# Patient Record
Sex: MALE | Race: WHITE | Employment: OTHER | URBAN - METROPOLITAN AREA
[De-identification: names, ages, dates, MRNs, and addresses within clinical notes are randomized per-mention and may not be internally consistent; named-entity substitution may affect disease eponyms.]

---

## 2017-03-31 ENCOUNTER — ANESTHESIA EVENT (OUTPATIENT)
Dept: MEDSURG UNIT | Age: 57
End: 2017-03-31
Payer: MEDICAID

## 2017-03-31 RX ORDER — CLOPIDOGREL BISULFATE 75 MG/1
75 TABLET ORAL DAILY
COMMUNITY
End: 2017-03-31

## 2017-03-31 RX ORDER — ISOSORBIDE MONONITRATE 30 MG/1
30 TABLET, EXTENDED RELEASE ORAL DAILY
COMMUNITY

## 2017-03-31 RX ORDER — CARVEDILOL 3.12 MG/1
3.12 TABLET ORAL 2 TIMES DAILY WITH MEALS
COMMUNITY

## 2017-03-31 RX ORDER — RANOLAZINE 1000 MG/1
1000 TABLET, EXTENDED RELEASE ORAL 2 TIMES DAILY
COMMUNITY

## 2017-03-31 RX ORDER — TESTOSTERONE 20.25 MG/1.25G
20.25 GEL TOPICAL DAILY
COMMUNITY
End: 2022-08-31 | Stop reason: ALTCHOICE

## 2017-03-31 RX ORDER — GUAIFENESIN 100 MG/5ML
LIQUID (ML) ORAL DAILY
COMMUNITY

## 2017-03-31 RX ORDER — GABAPENTIN 600 MG/1
600 TABLET ORAL 3 TIMES DAILY
COMMUNITY

## 2017-03-31 RX ORDER — METFORMIN HYDROCHLORIDE 1000 MG/1
1000 TABLET ORAL 2 TIMES DAILY WITH MEALS
COMMUNITY

## 2017-04-03 ENCOUNTER — SURGERY (OUTPATIENT)
Age: 57
End: 2017-04-03

## 2017-04-03 ENCOUNTER — HOSPITAL ENCOUNTER (OUTPATIENT)
Age: 57
Setting detail: OUTPATIENT SURGERY
Discharge: HOME | End: 2017-04-03
Attending: ORTHOPAEDIC SURGERY | Admitting: ORTHOPAEDIC SURGERY
Payer: MEDICAID

## 2017-04-03 ENCOUNTER — ANESTHESIA (OUTPATIENT)
Dept: MEDSURG UNIT | Age: 57
End: 2017-04-03
Payer: MEDICAID

## 2017-04-03 VITALS
HEART RATE: 53 BPM | WEIGHT: 230 LBS | SYSTOLIC BLOOD PRESSURE: 147 MMHG | TEMPERATURE: 97.9 F | DIASTOLIC BLOOD PRESSURE: 71 MMHG | HEIGHT: 69 IN | BODY MASS INDEX: 34.07 KG/M2 | OXYGEN SATURATION: 94 % | RESPIRATION RATE: 12 BRPM

## 2017-04-03 PROBLEM — G56.02 CARPAL TUNNEL SYNDROME OF LEFT WRIST: Status: ACTIVE | Noted: 2017-04-03

## 2017-04-03 PROBLEM — I25.10 CAD (CORONARY ARTERY DISEASE): Chronic | Status: ACTIVE | Noted: 2017-04-03

## 2017-04-03 PROBLEM — E10.65 HYPERGLYCEMIA DUE TO TYPE 1 DIABETES MELLITUS (HCC): Chronic | Status: ACTIVE | Noted: 2017-04-03

## 2017-04-03 LAB
GLUCOSE BLD STRIP.AUTO-MCNC: 100 MG/DL (ref 65–100)
GLUCOSE BLD STRIP.AUTO-MCNC: 111 MG/DL (ref 65–100)
SERVICE CMNT-IMP: ABNORMAL
SERVICE CMNT-IMP: NORMAL

## 2017-04-03 PROCEDURE — 74011250636 HC RX REV CODE- 250/636: Performed by: ORTHOPAEDIC SURGERY

## 2017-04-03 PROCEDURE — 74011250636 HC RX REV CODE- 250/636

## 2017-04-03 PROCEDURE — 76210000046 HC AMBSU PH II REC FIRST 0.5 HR: Performed by: ORTHOPAEDIC SURGERY

## 2017-04-03 PROCEDURE — 77030020754 HC CUF TRNQT 2BLA STRY -B: Performed by: ORTHOPAEDIC SURGERY

## 2017-04-03 PROCEDURE — 76060000061 HC AMB SURG ANES 0.5 TO 1 HR: Performed by: ORTHOPAEDIC SURGERY

## 2017-04-03 PROCEDURE — 77030018836 HC SOL IRR NACL ICUM -A: Performed by: ORTHOPAEDIC SURGERY

## 2017-04-03 PROCEDURE — 74011000250 HC RX REV CODE- 250

## 2017-04-03 PROCEDURE — 77030002916 HC SUT ETHLN J&J -A: Performed by: ORTHOPAEDIC SURGERY

## 2017-04-03 PROCEDURE — 77030006602 HC BLD CRPL AGEE MCRA -C: Performed by: ORTHOPAEDIC SURGERY

## 2017-04-03 PROCEDURE — 77030010509 HC AIRWY LMA MSK TELE -A: Performed by: ANESTHESIOLOGY

## 2017-04-03 PROCEDURE — 74011250636 HC RX REV CODE- 250/636: Performed by: ANESTHESIOLOGY

## 2017-04-03 PROCEDURE — 76030000000 HC AMB SURG OR TIME 0.5 TO 1: Performed by: ORTHOPAEDIC SURGERY

## 2017-04-03 PROCEDURE — 76210000034 HC AMBSU PH I REC 0.5 TO 1 HR: Performed by: ORTHOPAEDIC SURGERY

## 2017-04-03 PROCEDURE — 74011000250 HC RX REV CODE- 250: Performed by: ORTHOPAEDIC SURGERY

## 2017-04-03 PROCEDURE — 82962 GLUCOSE BLOOD TEST: CPT

## 2017-04-03 PROCEDURE — 77030032490 HC SLV COMPR SCD KNE COVD -B: Performed by: ORTHOPAEDIC SURGERY

## 2017-04-03 PROCEDURE — 77030006689 HC BLD OPHTH BVR BD -A: Performed by: ORTHOPAEDIC SURGERY

## 2017-04-03 RX ORDER — ALBUTEROL SULFATE 0.83 MG/ML
2.5 SOLUTION RESPIRATORY (INHALATION) AS NEEDED
Status: DISCONTINUED | OUTPATIENT
Start: 2017-04-03 | End: 2017-04-03 | Stop reason: HOSPADM

## 2017-04-03 RX ORDER — FENTANYL CITRATE 50 UG/ML
25 INJECTION, SOLUTION INTRAMUSCULAR; INTRAVENOUS
Status: DISCONTINUED | OUTPATIENT
Start: 2017-04-03 | End: 2017-04-03 | Stop reason: HOSPADM

## 2017-04-03 RX ORDER — FENTANYL CITRATE 50 UG/ML
INJECTION, SOLUTION INTRAMUSCULAR; INTRAVENOUS AS NEEDED
Status: DISCONTINUED | OUTPATIENT
Start: 2017-04-03 | End: 2017-04-03 | Stop reason: HOSPADM

## 2017-04-03 RX ORDER — MIDAZOLAM HYDROCHLORIDE 1 MG/ML
INJECTION, SOLUTION INTRAMUSCULAR; INTRAVENOUS AS NEEDED
Status: DISCONTINUED | OUTPATIENT
Start: 2017-04-03 | End: 2017-04-03 | Stop reason: HOSPADM

## 2017-04-03 RX ORDER — SODIUM CHLORIDE, SODIUM LACTATE, POTASSIUM CHLORIDE, CALCIUM CHLORIDE 600; 310; 30; 20 MG/100ML; MG/100ML; MG/100ML; MG/100ML
1000 INJECTION, SOLUTION INTRAVENOUS CONTINUOUS
Status: DISCONTINUED | OUTPATIENT
Start: 2017-04-03 | End: 2017-04-03 | Stop reason: HOSPADM

## 2017-04-03 RX ORDER — ONDANSETRON 2 MG/ML
INJECTION INTRAMUSCULAR; INTRAVENOUS AS NEEDED
Status: DISCONTINUED | OUTPATIENT
Start: 2017-04-03 | End: 2017-04-03 | Stop reason: HOSPADM

## 2017-04-03 RX ORDER — PHENYLEPHRINE HCL IN 0.9% NACL 0.4MG/10ML
SYRINGE (ML) INTRAVENOUS AS NEEDED
Status: DISCONTINUED | OUTPATIENT
Start: 2017-04-03 | End: 2017-04-03 | Stop reason: HOSPADM

## 2017-04-03 RX ORDER — MIDAZOLAM HYDROCHLORIDE 1 MG/ML
1 INJECTION, SOLUTION INTRAMUSCULAR; INTRAVENOUS AS NEEDED
Status: DISCONTINUED | OUTPATIENT
Start: 2017-04-03 | End: 2017-04-03 | Stop reason: HOSPADM

## 2017-04-03 RX ORDER — GLYCOPYRROLATE 0.2 MG/ML
0.2 INJECTION INTRAMUSCULAR; INTRAVENOUS
Status: DISCONTINUED | OUTPATIENT
Start: 2017-04-03 | End: 2017-04-03 | Stop reason: HOSPADM

## 2017-04-03 RX ORDER — DIPHENHYDRAMINE HYDROCHLORIDE 50 MG/ML
12.5 INJECTION, SOLUTION INTRAMUSCULAR; INTRAVENOUS AS NEEDED
Status: DISCONTINUED | OUTPATIENT
Start: 2017-04-03 | End: 2017-04-03 | Stop reason: HOSPADM

## 2017-04-03 RX ORDER — HYDROCODONE BITARTRATE AND ACETAMINOPHEN 5; 325 MG/1; MG/1
1-2 TABLET ORAL
Qty: 30 TAB | Refills: 0 | Status: SHIPPED | OUTPATIENT
Start: 2017-04-03

## 2017-04-03 RX ORDER — LIDOCAINE HYDROCHLORIDE 10 MG/ML
0.1 INJECTION, SOLUTION EPIDURAL; INFILTRATION; INTRACAUDAL; PERINEURAL AS NEEDED
Status: DISCONTINUED | OUTPATIENT
Start: 2017-04-03 | End: 2017-04-03 | Stop reason: HOSPADM

## 2017-04-03 RX ORDER — PROPOFOL 10 MG/ML
INJECTION, EMULSION INTRAVENOUS AS NEEDED
Status: DISCONTINUED | OUTPATIENT
Start: 2017-04-03 | End: 2017-04-03 | Stop reason: HOSPADM

## 2017-04-03 RX ORDER — ROPIVACAINE HYDROCHLORIDE 5 MG/ML
30 INJECTION, SOLUTION EPIDURAL; INFILTRATION; PERINEURAL AS NEEDED
Status: DISCONTINUED | OUTPATIENT
Start: 2017-04-03 | End: 2017-04-03 | Stop reason: HOSPADM

## 2017-04-03 RX ORDER — SODIUM CHLORIDE 9 MG/ML
25 INJECTION, SOLUTION INTRAVENOUS CONTINUOUS
Status: DISCONTINUED | OUTPATIENT
Start: 2017-04-03 | End: 2017-04-03 | Stop reason: HOSPADM

## 2017-04-03 RX ORDER — HYDROMORPHONE HYDROCHLORIDE 1 MG/ML
0.2 INJECTION, SOLUTION INTRAMUSCULAR; INTRAVENOUS; SUBCUTANEOUS
Status: DISCONTINUED | OUTPATIENT
Start: 2017-04-03 | End: 2017-04-03 | Stop reason: HOSPADM

## 2017-04-03 RX ORDER — HYDROCODONE BITARTRATE AND ACETAMINOPHEN 5; 325 MG/1; MG/1
1 TABLET ORAL AS NEEDED
Status: DISCONTINUED | OUTPATIENT
Start: 2017-04-03 | End: 2017-04-03 | Stop reason: HOSPADM

## 2017-04-03 RX ORDER — MIDAZOLAM HYDROCHLORIDE 1 MG/ML
0.5 INJECTION, SOLUTION INTRAMUSCULAR; INTRAVENOUS
Status: DISCONTINUED | OUTPATIENT
Start: 2017-04-03 | End: 2017-04-03 | Stop reason: HOSPADM

## 2017-04-03 RX ORDER — MORPHINE SULFATE 10 MG/ML
2 INJECTION, SOLUTION INTRAMUSCULAR; INTRAVENOUS
Status: DISCONTINUED | OUTPATIENT
Start: 2017-04-03 | End: 2017-04-03 | Stop reason: HOSPADM

## 2017-04-03 RX ORDER — ONDANSETRON 2 MG/ML
4 INJECTION INTRAMUSCULAR; INTRAVENOUS AS NEEDED
Status: DISCONTINUED | OUTPATIENT
Start: 2017-04-03 | End: 2017-04-03 | Stop reason: HOSPADM

## 2017-04-03 RX ORDER — CEFAZOLIN SODIUM IN 0.9 % NACL 2 G/50 ML
2 INTRAVENOUS SOLUTION, PIGGYBACK (ML) INTRAVENOUS ONCE
Status: COMPLETED | OUTPATIENT
Start: 2017-04-03 | End: 2017-04-03

## 2017-04-03 RX ORDER — LIDOCAINE HYDROCHLORIDE 20 MG/ML
INJECTION, SOLUTION EPIDURAL; INFILTRATION; INTRACAUDAL; PERINEURAL AS NEEDED
Status: DISCONTINUED | OUTPATIENT
Start: 2017-04-03 | End: 2017-04-03 | Stop reason: HOSPADM

## 2017-04-03 RX ORDER — FENTANYL CITRATE 50 UG/ML
50 INJECTION, SOLUTION INTRAMUSCULAR; INTRAVENOUS AS NEEDED
Status: DISCONTINUED | OUTPATIENT
Start: 2017-04-03 | End: 2017-04-03 | Stop reason: HOSPADM

## 2017-04-03 RX ADMIN — LIDOCAINE HYDROCHLORIDE 60 MG: 20 INJECTION, SOLUTION EPIDURAL; INFILTRATION; INTRACAUDAL; PERINEURAL at 07:31

## 2017-04-03 RX ADMIN — FENTANYL CITRATE 25 MCG: 50 INJECTION, SOLUTION INTRAMUSCULAR; INTRAVENOUS at 07:55

## 2017-04-03 RX ADMIN — CEFAZOLIN 2 G: 1 INJECTION, POWDER, FOR SOLUTION INTRAMUSCULAR; INTRAVENOUS; PARENTERAL at 07:41

## 2017-04-03 RX ADMIN — FENTANYL CITRATE 25 MCG: 50 INJECTION, SOLUTION INTRAMUSCULAR; INTRAVENOUS at 07:31

## 2017-04-03 RX ADMIN — Medication 80 MCG: at 07:37

## 2017-04-03 RX ADMIN — LIDOCAINE HYDROCHLORIDE 10 ML: 20 INJECTION, SOLUTION EPIDURAL; INFILTRATION; INTRACAUDAL; PERINEURAL at 08:02

## 2017-04-03 RX ADMIN — PROPOFOL 200 MG: 10 INJECTION, EMULSION INTRAVENOUS at 07:31

## 2017-04-03 RX ADMIN — SODIUM CHLORIDE, SODIUM LACTATE, POTASSIUM CHLORIDE, AND CALCIUM CHLORIDE 1000 ML: 600; 310; 30; 20 INJECTION, SOLUTION INTRAVENOUS at 07:21

## 2017-04-03 RX ADMIN — ONDANSETRON 4 MG: 2 INJECTION INTRAMUSCULAR; INTRAVENOUS at 08:01

## 2017-04-03 RX ADMIN — Medication 80 MCG: at 07:34

## 2017-04-03 RX ADMIN — MIDAZOLAM HYDROCHLORIDE 2 MG: 1 INJECTION, SOLUTION INTRAMUSCULAR; INTRAVENOUS at 07:29

## 2017-04-03 NOTE — BRIEF OP NOTE
BRIEF OPERATIVE NOTE    Date of Procedure: 4/3/2017   Preoperative Diagnosis: LEFT -CARPAL TUNNEL SYNDROME  Postoperative Diagnosis: LEFT -CARPAL TUNNEL SYNDROME    Procedure(s):  LEFT WRIST ENDOSCOPIC CARPAL TUNNEL RELEASE  Surgeon(s) and Role:     * Nemo Renner MD - Primary            Surgical Staff:  Circ-1: Juanito Capone RN  Scrub RN-1: Polina Bell RN  Event Time In   Incision Start 9509   Incision Close 0802     Anesthesia: General   Estimated Blood Loss: minimal  Specimens: * No specimens in log *   Findings: above   Complications: none  Implants: * No implants in log *

## 2017-04-03 NOTE — IP AVS SNAPSHOT
Current Discharge Medication List  
  
START taking these medications Dose & Instructions Dispensing Information Comments Morning Noon Evening Bedtime HYDROcodone-acetaminophen 5-325 mg per tablet Commonly known as:  Gely Lo Your last dose was: Your next dose is:    
   
   
 Dose:  1-2 Tab Take 1-2 Tabs by mouth every four (4) hours as needed for Pain. Max Daily Amount: 12 Tabs. Quantity:  30 Tab Refills:  0 CONTINUE these medications which have NOT CHANGED Dose & Instructions Dispensing Information Comments Morning Noon Evening Bedtime  
  insulin pump Misc Commonly known as:  PATIENT SUPPLIED Your last dose was: Your next dose is:    
   
   
 by SubCUTAneous route. Refills:  0  
     
   
   
   
  
 aspirin 81 mg chewable tablet Your last dose was: Your next dose is: Take  by mouth daily. Refills:  0 COREG 3.125 mg tablet Generic drug:  carvedilol Your last dose was: Your next dose is:    
   
   
 Dose:  3.125 mg Take 3.125 mg by mouth two (2) times daily (with meals). Refills:  0 IMDUR 30 mg tablet Generic drug:  isosorbide mononitrate ER Your last dose was: Your next dose is:    
   
   
 Dose:  30 mg Take 30 mg by mouth daily. Refills:  0  
     
   
   
   
  
 metFORMIN 1,000 mg tablet Commonly known as:  GLUCOPHAGE Your last dose was: Your next dose is:    
   
   
 Dose:  1000 mg Take 1,000 mg by mouth two (2) times daily (with meals). Refills:  0 NEURONTIN 600 mg tablet Generic drug:  gabapentin Your last dose was: Your next dose is:    
   
   
 Dose:  600 mg Take 600 mg by mouth three (3) times daily. Refills:  0  
     
   
   
   
  
 RANEXA 1,000 mg Tb12 Generic drug:  Ranolazine Your last dose was: Your next dose is:    
   
   
 Dose:  1000 mg Take 1,000 mg by mouth two (2) times a day. Refills:  0  
     
   
   
   
  
 testosterone 20.25 mg/1.25 gram (1.62 %) gel Commonly known as:  ANDROGEL Your last dose was: Your next dose is:    
   
   
 Dose:  20.25 mg  
20.25 mg by TransDERmal route daily. Refills:  0 Where to Get Your Medications Information on where to get these meds will be given to you by the nurse or doctor. ! Ask your nurse or doctor about these medications HYDROcodone-acetaminophen 5-325 mg per tablet

## 2017-04-03 NOTE — IP AVS SNAPSHOT
[address] 
[phone] Patient: Zander Olmedo MRN: [id number] RZF:[birth year] You are allergic to the following Allergen Reactions Altace (Ramipril) Anaphylaxis Insulins Angioedema Verdie Compton AND HUMALOG INSULINS Recent Documentation Height Weight BMI Smoking Status 1.74 m 104.3 kg 34.46 kg/m2 Former Smoker Emergency Contacts Name Discharge Info Relation Home Work Mobile Gabrielle Briggs  Sister [23] About your hospitalization You were admitted on:  April 3, 2017 You last received care in the:  [facility] PACU You were discharged on:  April 3, 2017 Unit phone number:  [phone] Why you were hospitalized Your primary diagnosis was:  Carpal Tunnel Syndrome Of Left Wrist  
 Your diagnoses also included:  Hyperglycemia Due To Type 1 Diabetes Mellitus (Hcc), Cad (Coronary Artery Disease) Providers Seen During Your Hospitalizations Provider Role Specialty Primary office phone Domenica Montoya MD Attending Provider Orthopedic Surgery [phone] Your Primary Care Physician (PCP) Primary Care Physician Office Phone Office Fax OTHER, PHYS ** None ** ** None ** Follow-up Information Follow up With Details Comments Contact Info Domenica Montoya MD Call in 1 week For wound re-check [address] [facility] [address] [place] [phone] [phone] Allie Ramos MD   Patient can only remember the practice name and not the physician Current Discharge Medication List  
  
START taking these medications Dose & Instructions Dispensing Information Comments Morning Noon Evening Bedtime HYDROcodone-acetaminophen 5-325 mg per tablet Commonly known as:  Taurus Muir Your last dose was: Your next dose is:    
   
   
 Dose:  1-2 Tab Take 1-2 Tabs by mouth every four (4) hours as needed for Pain. Max Daily Amount: 12 Tabs. Quantity:  30 Tab Refills:  0 CONTINUE these medications which have NOT CHANGED Dose & Instructions Dispensing Information Comments Morning Noon Evening Bedtime  
  insulin pump Misc Commonly known as:  PATIENT SUPPLIED Your last dose was: Your next dose is:    
   
   
 by SubCUTAneous route. Refills:  0  
     
   
   
   
  
 aspirin 81 mg chewable tablet Your last dose was: Your next dose is: Take  by mouth daily. Refills:  0 COREG 3.125 mg tablet Generic drug:  carvedilol Your last dose was: Your next dose is:    
   
   
 Dose:  3.125 mg Take 3.125 mg by mouth two (2) times daily (with meals). Refills:  0 IMDUR 30 mg tablet Generic drug:  isosorbide mononitrate ER Your last dose was: Your next dose is:    
   
   
 Dose:  30 mg Take 30 mg by mouth daily. Refills:  0  
     
   
   
   
  
 metFORMIN 1,000 mg tablet Commonly known as:  GLUCOPHAGE Your last dose was: Your next dose is:    
   
   
 Dose:  1000 mg Take 1,000 mg by mouth two (2) times daily (with meals). Refills:  0 NEURONTIN 600 mg tablet Generic drug:  gabapentin Your last dose was: Your next dose is:    
   
   
 Dose:  600 mg Take 600 mg by mouth three (3) times daily. Refills:  0  
     
   
   
   
  
 RANEXA 1,000 mg Tb12 Generic drug:  Ranolazine Your last dose was: Your next dose is:    
   
   
 Dose:  1000 mg Take 1,000 mg by mouth two (2) times a day. Refills:  0  
     
   
   
   
  
 testosterone 20.25 mg/1.25 gram (1.62 %) gel Commonly known as:  ANDROGEL Your last dose was: Your next dose is:    
   
   
 Dose:  20.25 mg  
20.25 mg by TransDERmal route daily. Refills:  0 Where to Get Your Medications Information on where to get these meds will be given to you by the nurse or doctor. ! Ask your nurse or doctor about these medications HYDROcodone-acetaminophen 5-325 mg per tablet Discharge Instructions DISCHARGE SUMMARY from Nurse The following personal items are in your possession at time of discharge: 
 
Dental Appliances: Uppers (permanent) Clothing:  (clothes in locker) PATIENT INSTRUCTIONS: 
 
 
F-face looks uneven A-arms unable to move or move unevenly S-speech slurred or non-existent T-time-call 911 as soon as signs and symptoms begin-DO NOT go Back to bed or wait to see if you get better-TIME IS BRAIN. Warning Signs of HEART ATTACK Call 911 if you have these symptoms: 
? Chest discomfort. Most heart attacks involve discomfort in the center of the chest that lasts more than a few minutes, or that goes away and comes back. It can feel like uncomfortable pressure, squeezing, fullness, or pain. ? Discomfort in other areas of the upper body. Symptoms can include pain or discomfort in one or both arms, the back, neck, jaw, or stomach. ? Shortness of breath with or without chest discomfort. ? Other signs may include breaking out in a cold sweat, nausea, or lightheadedness. Don't wait more than five minutes to call [address]! Fast action can save your life. Calling 911 is almost always the fastest way to get lifesaving treatment. Emergency Medical Services staff can begin treatment when they arrive  up to an hour sooner than if someone gets to the hospital by car. The discharge information has been reviewed with the caregiver. The caregiver verbalized understanding. Discharge medications reviewed with the caregiver and appropriate educational materials and side effects teaching were provided. Carpal Tunnel Syndrome: Care Instructions Your Care Instructions Carpal tunnel syndrome is a nerve problem. It can cause tingling, numbness, weakness, or pain in the fingers, thumb, and hand. The median nerve and several tough tissues called tendons run through a space in the wrist called the carpal tunnel. The repeated hand motions used in work and some hobbies and sports can put pressure on the nerve. Pregnancy and several conditions, including diabetes, arthritis, and an underactive thyroid, also can cause carpal tunnel syndrome. You may be able to limit an activity or do it differently to reduce your symptoms. You also can take other steps to feel better. If your symptoms are mild, 1 to 2 weeks of home treatment are likely to ease your pain. Surgery is needed only if other treatments do not work. Follow-up care is a key part of your treatment and safety. Be sure to make and go to all appointments, and call your doctor if you are having problems. It's also a good idea to know your test results and keep a list of the medicines you take. How can you care for yourself at home? · If possible, stop or reduce the activity that causes your symptoms. If you cannot stop the activity, take frequent breaks to rest and stretch or change hand positions to do a task. Try switching hands, such as when using a computer mouse. · Try to avoid bending or twisting your wrists. · Ask your doctor if you can take an over-the-counter pain medicine, such as acetaminophen (Tylenol), ibuprofen (Advil, Motrin), or naproxen (Aleve). Be safe with medicines. Read and follow all instructions on the label.  
· If your doctor prescribes corticosteroid medicine to help reduce pain and swelling, take it exactly as prescribed. Call your doctor if you think you are having a problem with your medicine. · Put ice or a cold pack on your wrist for 10 to 20 minutes at a time to ease pain. Put a thin cloth between the ice and your skin. · If your doctor or your physical or occupational therapist tells you to wear a wrist splint, wear it as directed to keep your wrist in a neutral position. This also eases pressure on your median nerve. · Ask your doctor whether you should have physical or occupational therapy to learn how to do tasks differently. · Try a yoga class to stretch your muscles and build strength in your hands and wrists. Yoga has been shown to ease carpal tunnel symptoms. To prevent carpal tunnel · When working at a computer, keep your hands and wrists in line with your forearms. Hold your elbows close to your sides. Take a break every 10 to 15 minutes. · Try these exercises: ¨ Warm up: Rotate your wrist up, down, and from side to side. Repeat this 4 times. Stretch your fingers far apart, relax them, then stretch them again. Repeat 4 times. Stretch your thumb by pulling it back gently, holding it, and then releasing it. Repeat 4 times. ¨ Prayer stretch: Start with your palms together in front of your chest just below your chin. Slowly lower your hands toward your waistline while keeping your hands close to your stomach and your palms together until you feel a mild to moderate stretch under your forearms. Hold for 10 to 20 seconds. Repeat 4 times. ¨ Wrist flexor stretch: Hold your arm in front of you with your palm up. Bend your wrist, pointing your hand toward the floor. With your other hand, gently bend your wrist further until you feel a mild to moderate stretch in your forearm. Hold for 10 to 20 seconds. Repeat 4 times. ¨ Wrist extensor stretch: Repeat the steps for the wrist flexor stretch, but begin with your extended hand palm down. · Squeeze a rubber exercise ball several times a day to keep your hands and fingers strong. · Avoid holding objects (such as a book) in one position for a long time. When possible, use your whole hand to grasp an object. Using just the thumb and index finger can put stress on the wrist. 
· Do not smoke. It can make this condition worse by reducing blood flow to the median nerve. If you need help quitting, talk to your doctor about stop-smoking programs and medicines. These can increase your chances of quitting for good. When should you call for help? Watch closely for changes in your health, and be sure to contact your doctor if: 
· Your pain or other problems do not get better with home care. · You want more information about physical or occupational therapy. · You have side effects of your corticosteroid medicine, such as: 
¨ Weight gain. ¨ Mood changes. ¨ Trouble sleeping. ¨ Bruising easily. · You have any other problems with your medicine. Where can you learn more? Go to http://ivis-colt.info/. Enter R432 in the search box to learn more about \"Carpal Tunnel Syndrome: Care Instructions. \" Current as of: May 23, 2016 Content Version: 11.2 © 0173-8618 [employer]. Care instructions adapted under license by [employer] (which disclaims liability or warranty for this information). If you have questions about a medical condition or this instruction, always ask your healthcare professional. [place] [address] any warranty or liability for your use of this information. Dr. Abi Hampton for Elbow/Wrist/Hand Surgery Medications/Diet 1. Eat only light, non-greasy foods today 2. Take pain medicine with food 3. While taking pain medicines, you may not operate a vehicle, heavy machinery, or appliances 4. While taking pain medicines, you may not drink alcoholic beverages 5. While taking pain medicines, you may not make critical decisions or sign legal papers 6. If you have any reactions to your medicines, stop taking them and call my office immediately 7. Please keep in mind that constipation is a very common side   effect of taking narcotic pain medication. We recommend that patients take precautions to prevent constipation: ? Drink plenty of water (6-8 glasses of 8 oz. a day) ? Avoid alcohol, caffeine, and dairy products ? Eat plenty of fiber (fruits, vegetables and whole grains) ? Take an over the counter stool softener (colace or dulcolax) ? Patients that have had upper extremity surgery should take frequent walks Activity/Exercise 1. Exercises are not necessary at this stage, and you will be given exercises at your first post operative visit 2. You are in a splint and should remain in this until your first postoperative visit 3. Please keep ice applied to the wrist for the first 72 hours or as long as pain or swelling persist. Do not apply ice directly to skin, or allow water to leak on your dressing Dressings/Shower 1. Please keep dressing dry 2. If you have had arthroscopy, you may expect some drainage 3. Please reinforce your dressing with a dry sterile dressing 4. Your dressing will be removed at your first post operative visit 5. You may not shower until after your first post operative visit Emergency/Follow-Up 1. Please notify my office at [phone] if you develop any fever (101° or above), unexpected warmth, redness or swelling in your hand. Please call if your fingers become cold, purple, numb, or there is excessive bleeding 2. Please call the office within 24 hours at [phone] (ext. [phone]) to schedule a follow up appointment next week 3. Please call the office before 3pm on Friday if you do not have enough pain medicine for the weekend.  Narcotic pain medication cannot be called into your pharmacy and the prescription must be picked up at our office. Discharge Orders None Introducing [facility]! Ann Yeh introduces [employer] patient portal. Now you can access parts of your medical record, email your doctor's office, and request medication refills online. 1. In your internet browser, go to [email or web address] 2. Click on the First Time User? Click Here link in the Sign In box. You will see the New Member Sign Up page. 3. Enter your [employer] Access Code exactly as it appears below. You will not need to use this code after youve completed the sign-up process. If you do not sign up before the expiration date, you must request a new code. · [employer] Access Code: [email or web address] Expires: 6/28/2017  2:55 PM 
 
4. Enter the last four digits of your Social Security Number (xxxx) and Date of Birth (mm/dd/yyyy) as indicated and click Submit. You will be taken to the next sign-up page. 5. Create a [employer]ay ID. This will be your [employer] login ID and cannot be changed, so think of one that is secure and easy to remember. 6. Create a [employer] password. You can change your password at any time. 7. Enter your Password Reset Question and Answer. This can be used at a later time if you forget your password. 8. Enter your e-mail address. You will receive e-mail notification when new information is available in [address] 9. Click Sign Up. You can now view and download portions of your medical record. 10. Click the Download Summary menu link to download a portable copy of your medical information. If you have questions, please visit the Frequently Asked Questions section of the [employer] website. Remember, [employer] is NOT to be used for urgent needs. For medical emergencies, dial 911. Now available from your iPhone and Android! General Information Please provide this summary of care documentation to your next provider. Patient Signature:  ____________________________________________________________ Date:  ____________________________________________________________  
  
Kathe Adelso Provider Signature:  ____________________________________________________________ Date:  ____________________________________________________________

## 2017-04-03 NOTE — DISCHARGE INSTRUCTIONS
DISCHARGE SUMMARY from Nurse    The following personal items are in your possession at time of discharge:    Dental Appliances: Uppers (permanent)              Clothing:  (clothes in locker)                PATIENT INSTRUCTIONS:    After general anesthesia or intravenous sedation, for 24 hours or while taking prescription Narcotics:  · Limit your activities  · Do not drive and operate hazardous machinery  · Do not make important personal or business decisions  · Do  not drink alcoholic beverages  · If you have not urinated within 8 hours after discharge, please contact your surgeon on call. Report the following to your surgeon:  · Excessive pain, swelling, redness or odor of or around the surgical area  · Temperature over 100.5  · Nausea and vomiting lasting longer than 4 hours or if unable to take medications  · Any signs of decreased circulation or nerve impairment to extremity: change in color, persistent  numbness, tingling, coldness or increase pain  · Any questions        What to do at Home:  Recommended activity: Activity as tolerated,     If you experience any of the following symptoms excess swelling pain, please follow up with Dr Estelle Villagomez. *  Please give a list of your current medications to your Primary Care Provider. *  Please update this list whenever your medications are discontinued, doses are      changed, or new medications (including over-the-counter products) are added. *  Please carry medication information at all times in case of emergency situations. These are general instructions for a healthy lifestyle:    No smoking/ No tobacco products/ Avoid exposure to second hand smoke    Surgeon General's Warning:  Quitting smoking now greatly reduces serious risk to your health.     Obesity, smoking, and sedentary lifestyle greatly increases your risk for illness    A healthy diet, regular physical exercise & weight monitoring are important for maintaining a healthy lifestyle    You may be retaining fluid if you have a history of heart failure or if you experience any of the following symptoms:  Weight gain of 3 pounds or more overnight or 5 pounds in a week, increased swelling in our hands or feet or shortness of breath while lying flat in bed. Please call your doctor as soon as you notice any of these symptoms; do not wait until your next office visit. Recognize signs and symptoms of STROKE:    F-face looks uneven    A-arms unable to move or move unevenly    S-speech slurred or non-existent    T-time-call 911 as soon as signs and symptoms begin-DO NOT go       Back to bed or wait to see if you get better-TIME IS BRAIN. Warning Signs of HEART ATTACK     Call 911 if you have these symptoms:   Chest discomfort. Most heart attacks involve discomfort in the center of the chest that lasts more than a few minutes, or that goes away and comes back. It can feel like uncomfortable pressure, squeezing, fullness, or pain.  Discomfort in other areas of the upper body. Symptoms can include pain or discomfort in one or both arms, the back, neck, jaw, or stomach.  Shortness of breath with or without chest discomfort.  Other signs may include breaking out in a cold sweat, nausea, or lightheadedness. Don't wait more than five minutes to call 911 - MINUTES MATTER! Fast action can save your life. Calling 911 is almost always the fastest way to get lifesaving treatment. Emergency Medical Services staff can begin treatment when they arrive -- up to an hour sooner than if someone gets to the hospital by car. The discharge information has been reviewed with the caregiver. The caregiver verbalized understanding. Discharge medications reviewed with the caregiver and appropriate educational materials and side effects teaching were provided. Carpal Tunnel Syndrome: Care Instructions  Your Care Instructions    Carpal tunnel syndrome is a nerve problem.  It can cause tingling, numbness, weakness, or pain in the fingers, thumb, and hand. The median nerve and several tough tissues called tendons run through a space in the wrist called the carpal tunnel. The repeated hand motions used in work and some hobbies and sports can put pressure on the nerve. Pregnancy and several conditions, including diabetes, arthritis, and an underactive thyroid, also can cause carpal tunnel syndrome. You may be able to limit an activity or do it differently to reduce your symptoms. You also can take other steps to feel better. If your symptoms are mild, 1 to 2 weeks of home treatment are likely to ease your pain. Surgery is needed only if other treatments do not work. Follow-up care is a key part of your treatment and safety. Be sure to make and go to all appointments, and call your doctor if you are having problems. It's also a good idea to know your test results and keep a list of the medicines you take. How can you care for yourself at home? · If possible, stop or reduce the activity that causes your symptoms. If you cannot stop the activity, take frequent breaks to rest and stretch or change hand positions to do a task. Try switching hands, such as when using a computer mouse. · Try to avoid bending or twisting your wrists. · Ask your doctor if you can take an over-the-counter pain medicine, such as acetaminophen (Tylenol), ibuprofen (Advil, Motrin), or naproxen (Aleve). Be safe with medicines. Read and follow all instructions on the label. · If your doctor prescribes corticosteroid medicine to help reduce pain and swelling, take it exactly as prescribed. Call your doctor if you think you are having a problem with your medicine. · Put ice or a cold pack on your wrist for 10 to 20 minutes at a time to ease pain. Put a thin cloth between the ice and your skin.   · If your doctor or your physical or occupational therapist tells you to wear a wrist splint, wear it as directed to keep your wrist in a neutral position. This also eases pressure on your median nerve. · Ask your doctor whether you should have physical or occupational therapy to learn how to do tasks differently. · Try a yoga class to stretch your muscles and build strength in your hands and wrists. Yoga has been shown to ease carpal tunnel symptoms. To prevent carpal tunnel  · When working at a computer, keep your hands and wrists in line with your forearms. Hold your elbows close to your sides. Take a break every 10 to 15 minutes. · Try these exercises:  ¨ Warm up: Rotate your wrist up, down, and from side to side. Repeat this 4 times. Stretch your fingers far apart, relax them, then stretch them again. Repeat 4 times. Stretch your thumb by pulling it back gently, holding it, and then releasing it. Repeat 4 times. ¨ Prayer stretch: Start with your palms together in front of your chest just below your chin. Slowly lower your hands toward your waistline while keeping your hands close to your stomach and your palms together until you feel a mild to moderate stretch under your forearms. Hold for 10 to 20 seconds. Repeat 4 times. ¨ Wrist flexor stretch: Hold your arm in front of you with your palm up. Bend your wrist, pointing your hand toward the floor. With your other hand, gently bend your wrist further until you feel a mild to moderate stretch in your forearm. Hold for 10 to 20 seconds. Repeat 4 times. ¨ Wrist extensor stretch: Repeat the steps for the wrist flexor stretch, but begin with your extended hand palm down. · Squeeze a rubber exercise ball several times a day to keep your hands and fingers strong. · Avoid holding objects (such as a book) in one position for a long time. When possible, use your whole hand to grasp an object. Using just the thumb and index finger can put stress on the wrist.  · Do not smoke. It can make this condition worse by reducing blood flow to the median nerve.  If you need help quitting, talk to your doctor about stop-smoking programs and medicines. These can increase your chances of quitting for good. When should you call for help? Watch closely for changes in your health, and be sure to contact your doctor if:  · Your pain or other problems do not get better with home care. · You want more information about physical or occupational therapy. · You have side effects of your corticosteroid medicine, such as:  ¨ Weight gain. ¨ Mood changes. ¨ Trouble sleeping. ¨ Bruising easily. · You have any other problems with your medicine. Where can you learn more? Go to http://ivis-colt.info/. Enter R432 in the search box to learn more about \"Carpal Tunnel Syndrome: Care Instructions. \"  Current as of: May 23, 2016  Content Version: 11.2  © 2073-6601 [employer]. Care instructions adapted under license by [employer] (which disclaims liability or warranty for this information). If you have questions about a medical condition or this instruction, always ask your healthcare professional. [place] [address] any warranty or liability for your use of this information. Dr. Hazel Ernandez Instructions for Elbow/Wrist/Hand Surgery    Medications/Diet  1. Eat only light, non-greasy foods today  2. Take pain medicine with food  3. While taking pain medicines, you may not operate a vehicle, heavy machinery, or appliances  4. While taking pain medicines, you may not drink alcoholic beverages  5. While taking pain medicines, you may not make critical decisions or sign legal papers  6. If you have any reactions to your medicines, stop taking them and call my office immediately  7. Please keep in mind that constipation is a very common side   effect of taking narcotic pain medication.  We recommend that patients take precautions to prevent constipation:   Drink plenty of water (6-8 glasses of 8 oz. a day)   Avoid alcohol, caffeine, and dairy products   Eat plenty of fiber (fruits, vegetables and whole grains)   Take an over the counter stool softener (colace or dulcolax)   Patients that have had upper extremity surgery should take frequent walks      Activity/Exercise    1. Exercises are not necessary at this stage, and you will be given exercises at your first post operative visit  2. You are in a splint and should remain in this until your first postoperative visit  3. Please keep ice applied to the wrist for the first 72 hours or as long as pain or swelling persist. Do not apply ice directly to skin, or allow water to leak on your dressing    Dressings/Shower    1. Please keep dressing dry  2. If you have had arthroscopy, you may expect some drainage  3. Please reinforce your dressing with a dry sterile dressing  4. Your dressing will be removed at your first post operative visit  5. You may not shower until after your first post operative visit    Emergency/Follow-Up    1. Please notify my office at [phone] if you develop any fever (101° or above), unexpected warmth, redness or swelling in your hand. Please call if your fingers become cold, purple, numb, or there is excessive bleeding  2. Please call the office within 24 hours at [phone] (ext. [phone]) to schedule a follow up appointment next week  3. Please call the office before 3pm on Friday if you do not have enough pain medicine for the weekend. Narcotic pain medication cannot be called into your pharmacy and the prescription must be picked up at our office.

## 2017-04-03 NOTE — ROUTINE PROCESS
Patient: Hollie Steward MRN: [id number]  SSN: [id number]   YOB: 1960  Age: 59 y.o. Sex: male     Patient is status post Procedure(s):  LEFT ENDOSCOPIC CARPAL TUNNEL RELEASE.     Surgeon(s) and Role:     * Alda Cooley MD - Primary    Local/Dose/Irrigation:  SEE MAR                  Peripheral IV 04/03/17 Right Arm (Active)   Dressing Status Clean, dry, & intact 4/3/2017  7:00 AM   Dressing Type Transparent 4/3/2017  7:00 AM                           Dressing/Packing:  Wound Hand Left-DRESSING TYPE:  (XEROFORM, 4X4, SHEET WADDING, ACE) (04/03/17 0700)  Splint/Cast:  ]    Other:

## 2017-04-03 NOTE — ANESTHESIA PREPROCEDURE EVALUATION
Anesthetic History   No history of anesthetic complications            Review of Systems / Medical History  Patient summary reviewed, nursing notes reviewed and pertinent labs reviewed    Pulmonary        Sleep apnea  Undiagnosed apnea         Neuro/Psych   Within defined limits           Cardiovascular    Hypertension          CAD    Exercise tolerance: <4 METS     GI/Hepatic/Renal  Within defined limits              Endo/Other    Diabetes: type 1, using insulin         Other Findings              Physical Exam    Airway  Mallampati: II  TM Distance: > 6 cm  Neck ROM: normal range of motion   Mouth opening: Normal     Cardiovascular  Regular rate and rhythm,  S1 and S2 normal,  no murmur, click, rub, or gallop             Dental    Dentition: Full upper dentures     Pulmonary  Breath sounds clear to auscultation               Abdominal  GI exam deferred       Other Findings            Anesthetic Plan    ASA: 3  Anesthesia type: general          Induction: Intravenous  Anesthetic plan and risks discussed with: Patient

## 2017-04-03 NOTE — ANESTHESIA POSTPROCEDURE EVALUATION
Post-Anesthesia Evaluation and Assessment    Patient: Junior Tyler MRN: [id number]  SSN: [id number]    YOB: 1960  Age: 59 y.o. Sex: male       Cardiovascular Function/Vital Signs  Visit Vitals    /71 (BP 1 Location: Right arm, BP Patient Position: At rest)    Pulse (!) 53    Temp 36.6 °C (97.9 °F)    Resp 12    Ht 5' 8.5\" (1.74 m)    Wt 104.3 kg (230 lb)    SpO2 94%    BMI 34.46 kg/m2       Patient is status post general anesthesia for Procedure(s):  LEFT ENDOSCOPIC CARPAL TUNNEL RELEASE. Nausea/Vomiting: None    Postoperative hydration reviewed and adequate. Pain:  Pain Scale 1: Numeric (0 - 10) (04/03/17 0910)  Pain Intensity 1: 0 (04/03/17 0910)   Managed    Neurological Status:   Neuro (WDL): Within Defined Limits (04/03/17 0910)   At baseline    Mental Status and Level of Consciousness: Arousable    Pulmonary Status:   O2 Device: Room air (04/03/17 0910)   Adequate oxygenation and airway patent    Complications related to anesthesia: None    Post-anesthesia assessment completed.  No concerns    Signed By: Ambar Epperson MD     April 3, 2017

## 2017-04-03 NOTE — H&P
Date of Surgery Update:  Carlton Pruett was seen and examined. History and physical has been reviewed. The patient has been examined. There have been no significant clinical changes since the completion of the originally dated History and Physical.  Patient identified by surgeon; surgical site was confirmed by patient and surgeon.     Signed By: Charlene Weaver MD     April 3, 2017 7:17 AM

## 2019-11-22 NOTE — OP NOTES
[address] [facility] [address], [address]   OP NOTE       Name:  Wil Torres   MR#:  [id number]   :  1960   Account #:  [de-identified]    Surgery Date:  2017   Date of Adm:  2017       PREOPERATIVE DIAGNOSIS: Left wrist carpal tunnel syndrome. POSTOPERATIVE DIAGNOSIS: Left wrist carpal tunnel syndrome. PROCEDURES PERFORMED: Left wrist endoscopic carpal tunnel   release. SURGEON: Allan Sloan MD     ANESTHESIA: General.    DRAINS: There were no drains placed. COMPLICATIONS: No complications. TOURNIQUET TIME: 9 minutes at 250 mmHg on the left arm. ESTIMATED BLOOD LOSS: Less than 5 mL. SPECIMENS REMOVED: None. INDICATIONS: The patient is a 78-year-old right-hand dominant   gentleman with history of diabetes and coronary disease and elected   to be taken to the operating room today for a left wrist endoscopic   carpal tunnel release. DESCRIPTION OF PROCEDURE: The patient was identified, taken   into the operating room, placed supine on the operating table. He   received an LMA and general anesthesia by the anesthesia service. His left upper extremity was prepped and draped sterilely. After   Esmarch exsanguination, the tourniquet was inflated to 250 mmHg. A   1-1.5 cm transverse incision was made centrally in the proximal wrist   flexion crease. Under loupe magnification, skin flaps were elevated. A   distal based antebrachial fascia flap was elevated, gaining entrance   into the carpal tunnel. The micro instruments were utilized to remove   tenosynovium from the undersurface of the transverse carpal ligament. Dilators were placed, followed by the endoscope. There was excellent   visualization from distal to proximal and the transverse carpal ligament   was released endoscopically, staying ulnar to the carpal tunnel to   avoid injury to the median nerve.  Approximately 2-3 cm of distal   antebrachial fascia was further released with tenotomy Pt demographics sent to fax number below. Fax confirmation received. scissors to   complete the decompression. The fascia flap was excised. The wound   was irrigated with saline and then closed with 4-0 nylon suture and   injected with 10 mL of 0.5% Marcaine, 2% lidocaine in a 10 mL volume   in a 50:50 mixture. Soft dressings were applied. The tourniquet   released. The hand was pink and had good refill. He was transported   into the recovery room postoperatively in good condition.         Amie Krishnamurthy MD      [facility] / Heather Javed   D:  04/03/2017   08:08   T:  04/03/2017   08:48   Job #:  [id number]

## 2022-03-18 PROBLEM — E10.65 HYPERGLYCEMIA DUE TO TYPE 1 DIABETES MELLITUS (HCC): Status: ACTIVE | Noted: 2017-04-03

## 2022-03-18 PROBLEM — G56.02 CARPAL TUNNEL SYNDROME OF LEFT WRIST: Status: ACTIVE | Noted: 2017-04-03

## 2022-03-19 PROBLEM — I25.10 CAD (CORONARY ARTERY DISEASE): Status: ACTIVE | Noted: 2017-04-03

## 2022-05-03 ENCOUNTER — TELEPHONE (OUTPATIENT)
Dept: CARDIOLOGY CLINIC | Age: 62
End: 2022-05-03

## 2022-05-03 NOTE — TELEPHONE ENCOUNTER
Unable to reach patient regarding a new patient appointment. Will attempt again to get records from [facility].

## 2022-05-05 ENCOUNTER — TELEPHONE (OUTPATIENT)
Dept: CARDIOLOGY CLINIC | Age: 62
End: 2022-05-05

## 2022-05-05 NOTE — TELEPHONE ENCOUNTER
Returned call to patient regarding his referral.    Will reach out to Dr. Yojana Noel who did his stents. Patient isn't sure if he has heart failure. Will discuss further with Dr. Almita Saldaña to see if she will see him.

## 2022-06-28 ENCOUNTER — TELEPHONE (OUTPATIENT)
Dept: CARDIOLOGY CLINIC | Age: 62
End: 2022-06-28

## 2022-06-29 ENCOUNTER — TELEPHONE (OUTPATIENT)
Dept: CARDIOLOGY CLINIC | Age: 62
End: 2022-06-29

## 2022-06-29 NOTE — TELEPHONE ENCOUNTER
Patient returned my call regarding referral.    He recently had another heart attack and a stent put in. Will reach ou to Dr. Susan Noble office in [facility] to get these records. Office number #      Chart will be reviewed by álvaro.

## 2022-07-05 ENCOUNTER — TELEPHONE (OUTPATIENT)
Dept: CARDIOLOGY CLINIC | Age: 62
End: 2022-07-05

## 2022-07-05 NOTE — TELEPHONE ENCOUNTER
Spoke with patient regarding his new patient appointment. Appointment is scheduled on Friday July 22nd at 10am.    Appointment and directions have been mailed to patient.     Informed patient our office will call him the day before as a reminder and to screen him for Covid

## 2022-07-19 ENCOUNTER — TELEPHONE (OUTPATIENT)
Dept: CARDIOLOGY CLINIC | Age: 62
End: 2022-07-19

## 2022-07-19 NOTE — TELEPHONE ENCOUNTER
Telephone Call RE:  Appointment reminder     Outcome:     [] Patient confirmed appointment   [] Patient rescheduled appointment for    [] Unable to reach  [x] Left message             [] Other:     ---------------------             [] Screened for COVID    Requesting a return call . Need to move appointment to 9:30am.  Will confirm with patient prior to moving it.       Naila Milton

## 2022-07-20 ENCOUNTER — TELEPHONE (OUTPATIENT)
Dept: CARDIOLOGY CLINIC | Age: 62
End: 2022-07-20

## 2022-07-20 NOTE — TELEPHONE ENCOUNTER
Telephone Call RE:  Appointment reminder     Outcome:     [x] Patient confirmed appointment   [] Patient rescheduled appointment for    [] Unable to reach  [] Left message             [] Other:     ---------------------             [x] Screened for COVID    Will email patient directions to our office.     Patient has agreed to move appointment to 9:30am     Osiel Jackson

## 2022-07-22 ENCOUNTER — TELEPHONE (OUTPATIENT)
Dept: CARDIOLOGY CLINIC | Age: 62
End: 2022-07-22

## 2022-07-22 ENCOUNTER — OFFICE VISIT (OUTPATIENT)
Dept: CARDIOLOGY CLINIC | Age: 62
End: 2022-07-22
Payer: MEDICAID

## 2022-07-22 VITALS
WEIGHT: 201.2 LBS | RESPIRATION RATE: 12 BRPM | SYSTOLIC BLOOD PRESSURE: 130 MMHG | HEART RATE: 72 BPM | HEIGHT: 68 IN | TEMPERATURE: 97.8 F | DIASTOLIC BLOOD PRESSURE: 64 MMHG | OXYGEN SATURATION: 96 % | BODY MASS INDEX: 30.49 KG/M2

## 2022-07-22 DIAGNOSIS — I50.9 CHRONIC HEART FAILURE, UNSPECIFIED HEART FAILURE TYPE (HCC): ICD-10-CM

## 2022-07-22 DIAGNOSIS — I10 ESSENTIAL HYPERTENSION: Primary | ICD-10-CM

## 2022-07-22 DIAGNOSIS — R06.02 SHORTNESS OF BREATH: ICD-10-CM

## 2022-07-22 PROCEDURE — 93000 ELECTROCARDIOGRAM COMPLETE: CPT | Performed by: INTERNAL MEDICINE

## 2022-07-22 PROCEDURE — 94618 PULMONARY STRESS TESTING: CPT | Performed by: INTERNAL MEDICINE

## 2022-07-22 PROCEDURE — 99204 OFFICE O/P NEW MOD 45 MIN: CPT | Performed by: INTERNAL MEDICINE

## 2022-07-22 RX ORDER — ALBUTEROL SULFATE 90 UG/1
2 AEROSOL, METERED RESPIRATORY (INHALATION)
COMMUNITY
Start: 2021-12-10

## 2022-07-22 RX ORDER — HYDROXYZINE 25 MG/1
25 TABLET, FILM COATED ORAL
COMMUNITY

## 2022-07-22 RX ORDER — INSULIN ASPART 100 [IU]/ML
INJECTION, SOLUTION INTRAVENOUS; SUBCUTANEOUS
COMMUNITY

## 2022-07-22 RX ORDER — ATORVASTATIN CALCIUM 80 MG/1
80 TABLET, FILM COATED ORAL DAILY
COMMUNITY
Start: 2022-05-23

## 2022-07-22 RX ORDER — SEMAGLUTIDE 1.34 MG/ML
1 INJECTION, SOLUTION SUBCUTANEOUS
COMMUNITY

## 2022-07-22 RX ORDER — AMITRIPTYLINE HYDROCHLORIDE 75 MG/1
75 TABLET, FILM COATED ORAL DAILY
COMMUNITY
Start: 2022-06-29

## 2022-07-22 RX ORDER — CLOPIDOGREL BISULFATE 75 MG/1
75 TABLET ORAL DAILY
COMMUNITY

## 2022-07-22 RX ORDER — CELECOXIB 100 MG/1
100 CAPSULE ORAL DAILY
COMMUNITY

## 2022-07-22 RX ORDER — NITROGLYCERIN 0.4 MG/1
0.4 TABLET SUBLINGUAL
COMMUNITY

## 2022-07-22 NOTE — TELEPHONE ENCOUNTER
Called, left vm for pt to return call to office. Informed pt of appointment scheduled for 7/27/2021 at 1:40 PM with Dr. Joycie Schlatter. Pt requested the information be left on his voicemail as well as the number and address of the facility:    [address]   Phone: [phone]    Message left on pt's voicemail.

## 2022-07-22 NOTE — PROGRESS NOTES
[facility] in [place], [facility] Note    Patient name: Melba Doll  Patient : 1960  Patient MRN: [id number]  Date of service: 22    Primary care physician: Richard, MD Allie  Primary general cardiologist:  [facility]  Primary [facility] cardiologist: Darshan Fraire MD    CHIEF COMPLAINT:  Chronic systolic heart failure    PLAN OF CARE:  58 y/o male with h/o CAD s/p CABG 3V and possible early pulmonary sarcoidosis, normal heart function, LVEF 50-55%, stage C, NYHA class IIIA symptoms presents for further evaluation of dyspnea out of proportion to heart function > schedule RHC - patient prefers to be done in [facility]    PLAN:  Schedule RHC  Continue current medical therapy for heart failure  Continue current dose of coreg 3.125mg twice daily  Continue imdur 30mg daily  Continue ASA and plavix   Continue current dose of statin  Continue ranolazone 1000mg daily  Obtain medical records and labs   Provided advanced care plan forms to be filled out    Follow-up with primary cardiologist  Follow-up with pulmonary to evaluate for pulmonary sarcoidosis; obtain PFT  Recommend flu, covid and pneumonia vaccinations  [facility] after [facility] with MD    IMPRESSION:  Fatigue  Shortness of breath  Leg pain  Chronic systolic heart failure  Stage C, NYHA class IIIA symptoms  Normal heart function, LVEF 50-55%  Coronary artery disease  S/p PCI to RCA w/RADHA  S/p 3V CABG  PVD s/p stent  Cardiac risk factors  HTN  HL  DM2  Tobacco, quit  KRISSY, cannot tolerate CPAP  Possible early lung sarcoid  Family history of sarcoidosis    See further details in medical records - reviewed (scanned in media)    CARDIAC IMAGING:  Echo () LVEF 50-55%, no valvular abnormalities  Echo () LVEF 50-55%, no valvular abnormalities  EKG (22) NSR 69bpm, Q waves inferior, poor R wave progression  LHC () patent SVG to OM, occluded CONNELL to LAD, patent distal LAD stent, proximal LAD 80%, stenosis treated with RADHA  [facility] (22) LIMA to LAD chronically occluded, SVG to OM2 patent and backfills OM1, SVG to PDA patent and backfills PL, native LAD with mid 60%, focal 8% 70% ISR to sent in distal vessle, LAD small. PCI () 80% stenosis p and distal SFA, 50% stenosis in right and left external iliac to be treated. HEMODYNAMICS:  RHC not done  CPEST not done  6 Min Walk Report 2022   (PRE) HR 71   (PRE) O2 Sat 99   (POST) HR 78   (POST) O2 Sat 95   Distance in Meters 159.17     OTHER IMAGING:  CT chest (21) stable, mild subpleural fibrosis    LIFE GOALS:  Lifestyle goals discussed with the patient. HISTORY OF PRESENT ILLNESS:  I had the pleasure of seeing Hannah Jones in [facility] in [address]. Briefly, Hannah Jones is a 57 y.o. male with h/o HTN, HL, KRISSY, h/o tobacco use (quit), CAD s/p CABG and multiple PCI, family history of sarcoid (mom  of pulmonary sarcoid) and he likely has early pulmonary sarcoid; [facility] self-referred to evaluate for possible causes of dyspnea. INTERVAL HISTORY:  Today, patient presents for routine clinic visit. Patient is doing \"okay\". Patient walked to our clinic from [address] parking. Patient can walk not more than 0.5 block due to claudication and dyspnea. Dyspnea can happen even with talking. He does not use stairs. Patient denies symptoms of volume overload or leg edema. Patient denies abdominal bloating or change of appetite. Patient's weight remained stable. Patient denies orthopnea, PND or nocturia. Patient denies irregular heart rate or palpitations. No presyncope or syncope. Patient denies other cardiac symptoms such as chest pain; has claudication with walking. Patient is compliant with fluid restriction and taking medications as prescribed. Patient manages his own medications.      REVIEW OF SYSTEMS:  General: Denies fever, night sweats. Ear, nose and throat: Denies difficulty hearing, sinus problems, runny nose, post-nasal drip, ringing in ears, mouth sores, loose teeth, ear pain, nosebleeds, sore throate, facial pain or numbess  Cardiovascular: see above in the interval history  Respiratory: Denies cough, wheezing, sputum production, hemoptysis. Gastrointestinal: Denies heartburn, constipation, diarrhea, abdominal pain, nausea, vomiting, difficulty swallowing, blood in stool  Kidney and bladder: Denies painful urination, frequent urination, urgency  Musculoskeletal: Denies joint pain, muscle weakness  Skin and hair: Denies change in existing skin lesions, hair loss or increase, breast changes    PHYSICAL EXAM:  Visit Vitals  /64 (BP 1 Location: Left arm, BP Patient Position: Sitting, BP Cuff Size: Adult)   Pulse 72   Temp 97.8 °F (36.6 °C) (Oral)   Resp 12   Ht 5' 8\" (1.727 m)   Wt 201 lb 3.2 oz (91.3 kg)   SpO2 96%   BMI 30.59 kg/m²     General: Patient is well developed, well-nourished in no acute distress  HEENT: Normocephalic and atraumatic. No scleral icterus. Pupils are equal, round and reactive to light and accomodation. No conjunctival injection. Oropharynx is clear. Neck: Supple. No evidence of thyroid enlargements or lymphadenopathy. JVD: Cannot be appreciated   Lungs: Breath sounds are equal and clear bilaterally. No wheezes, rhonchi, or rales. Heart: Regular rate and rhythm with normal S1 and S2. No murmurs, gallops or rubs. Abdomen: Soft, no mass or tenderness. No organomegaly or hernia. Bowel sounds present. Genitourinary and rectal: deferred  Extremities: No cyanosis, clubbing, or edema. Neurologic: No focal sensory or motor deficits are noted. Grossly intact. Psychiatric: Awake, alert an doriented x 3. Appropriate mood and affect. Skin: Warm, dry and well perfused. No lesions, nodules or rashes are noted.     PAST MEDICAL HISTORY:  Past Medical History:   Diagnosis Date Anginal syndrome (HCC)     CAD (coronary artery disease)     Carpal tunnel syndrome, bilateral     Chronic obstructive pulmonary disease (HCC)     Hyperlipidemia     Hypertension     MI (myocardial infarction) (HCC)     Migraine     Orthostatic hypotension     PAD (peripheral artery disease) (HCC)     Sleep apnea     no cpap    T2DM (type 2 diabetes mellitus) ([facility] [address]        PAST SURGICAL HISTORY:  Past Surgical History:   Procedure Laterality Date    HX CORONARY ARTERY BYPASS GRAFT  2011    3-vessel    HX CORONARY STENT PLACEMENT  2011    HX ORTHOPAEDIC      left knee and tibia fx with hardware       FAMILY HISTORY:  No family history on file. SOCIAL HISTORY:  Social History     Socioeconomic History    Marital status: SINGLE   Tobacco Use    Smoking status: Former    Smokeless tobacco: Never   Substance and Sexual Activity    Alcohol use: No    Drug use: Never       LABORATORY RESULTS:  No flowsheet data found. ALLERGY:  Allergies   Allergen Reactions    Altace [Ramipril] Anaphylaxis    Insulins Angioedema     NOVOLOG AND HUMALOG INSULINS        CURRENT MEDICATIONS:    Current Outpatient Medications:     albuterol (PROVENTIL HFA, VENTOLIN HFA, PROAIR HFA) 90 mcg/actuation inhaler, Take 2 Puffs by inhalation every six (6) hours as needed. , Disp: , Rfl:     amitriptyline (ELAVIL) 75 mg tablet, Take 75 mg by mouth in the morning., Disp: , Rfl:     atorvastatin (LIPITOR) 80 mg tablet, Take 80 mg by mouth in the morning., Disp: , Rfl:     celecoxib (CELEBREX) 100 mg capsule, Take 100 mg by mouth in the morning., Disp: , Rfl:     clopidogreL (Plavix) 75 mg tab, Take 75 mg by mouth in the morning., Disp: , Rfl:     hydrOXYzine HCL (ATARAX) 25 mg tablet, Take 25 mg by mouth three (3) times daily as needed. , Disp: , Rfl:     canagliflozin (Invokana) 300 mg tablet, Take 300 mg by mouth Daily (before breakfast). , Disp: , Rfl:     nitroglycerin (Nitrostat) 0.4 mg SL tablet, 0.4 mg by SubLINGual route every five (5) minutes as needed for Chest Pain. Up to 3 doses. , Disp: , Rfl:     semaglutide (Ozempic) 1 mg/dose (4 mg/3 mL) pnij, 1 mg by SubCUTAneous route., Disp: , Rfl:     insulin aspart U-100 (NovoLOG U-100 Insulin aspart) 100 unit/mL injection, by SubCUTAneous route. Use per pump settings 1.0-1.5 units/hr plus 10-20 units with meals, Disp: , Rfl:     HYDROcodone-acetaminophen (NORCO) 5-325 mg per tablet, Take 1-2 Tabs by mouth every four (4) hours as needed for Pain. Max Daily Amount: 12 Tabs., Disp: 30 Tab, Rfl: 0    aspirin 81 mg chewable tablet, Take  by mouth daily. , Disp: , Rfl:     carvediloL (COREG) 3.125 mg tablet, Take 3.125 mg by mouth two (2) times daily (with meals). , Disp: , Rfl:     gabapentin (NEURONTIN) 600 mg tablet, Take 600 mg by mouth three (3) times daily. , Disp: , Rfl:      insulin pump (PATIENT SUPPLIED) misc, by SubCUTAneous route., Disp: , Rfl:     isosorbide mononitrate ER (IMDUR) 30 mg tablet, Take 30 mg by mouth daily. , Disp: , Rfl:     metFORMIN (GLUCOPHAGE) 1,000 mg tablet, Take 1,000 mg by mouth two (2) times daily (with meals). , Disp: , Rfl:     ranolazine ER (RANEXA) 1,000 mg, Take 1,000 mg by mouth two (2) times a day., Disp: , Rfl:     testosterone (ANDROGEL) 20.25 mg/1.25 gram (1.62 %) gel, 20.25 mg by TransDERmal route daily. (Patient not taking: Reported on 7/22/2022), Disp: , Rfl:     Thank you for your referral and allowing me to participate in this patient's care.     Gregg Summers MD PhD  [address] [place] [address], [address]  Phone: [phone]  Fax: [phone]    PATIENT CARE TEAM:  Patient Care Team:  Other, MD Allie as PCP - General     Total visit time: 60 minutes  (> 50% spent face-to-face counseling)

## 2022-07-22 NOTE — PATIENT INSTRUCTIONS
Medication changes:    No medication changes     Please take this to your pharmacy to notify them of the change in medications. Testing Ordered:    6 minute walk     EKG       Other Recommendations:       A referral to primary cardiology has been made someone will contact you for scheduling. Once you have become established with primary cardiologist. A right heart cath will be scheduled. Please contact our office once RHC is scheduled to schedule follow up [phone]     Advance care planning documents provided please fill out and bring to next appt. Ensure your drinking an adequate amount of water with a goal of 6-8 eight ounce glasses (1.5-2 liters) of fluid daily. Your urine should be clear and light yellow straw colored. If your blood pressure begins to consistently run below 90/60 and/or you begin to experience dizziness or lightheadedness, please contact the [address] at [phone]. Follow up 1 week after right heart cath  with [facility]      Please monitor your weights daily upon waking and after using the bathroom. Keep a written records of your weights and bring to your next appointment. If you have a weight gain of 3 or more pounds overnight OR 5 or more pounds in one week please contact our office. Thank you for allowing us the privilege of being a part of your healthcare team! Please do not hesitate to contact our office at [phone] with any questions or concerns. Virtual Heart Failure [address] invites you to learn more about heart failure and to share your questions, ideas, and experiences with others. Each month, the Heart Failure Support Group features a new educational topic and a guest speaker, followed by an interactive discussion. Our Heart Failure Nurse Navigator will moderate each session. You will be able to participate by phone, tablet or computer through [address] [place] [address].  This support group takes place on the 3rd Thursday of each month from 6:00-7:30PM. All individuals living with heart failure and their caregivers are welcome to join. If you are interested in participating, please contact us at [email or web address] and you will be sent the link to join the [employer].

## 2022-08-04 ENCOUNTER — TELEPHONE (OUTPATIENT)
Dept: CARDIOLOGY CLINIC | Age: 62
End: 2022-08-04

## 2022-08-04 NOTE — TELEPHONE ENCOUNTER
Called [facility] for patient that he has an appointment on 08/31/22 with Dr. Ward Fraction Phone [phone].

## 2022-08-04 NOTE — TELEPHONE ENCOUNTER
----- Message from Magdalena Azevedo RN sent at 8/4/2022  2:53 PM EDT -----  Patient returned your call and left a vm on my phone line. Patient provided call back number of [phone]. Thanks!

## 2022-08-04 NOTE — TELEPHONE ENCOUNTER
Spoke to patient gave him information for upcoming Cardiology Appt [facility] 08/31/22 at 01:40 phone [phone]. Mailed patent appointment information and a copy of last AVS.

## 2022-08-31 ENCOUNTER — OFFICE VISIT (OUTPATIENT)
Dept: CARDIOLOGY CLINIC | Age: 62
End: 2022-08-31
Payer: MEDICAID

## 2022-08-31 VITALS
HEART RATE: 70 BPM | DIASTOLIC BLOOD PRESSURE: 88 MMHG | WEIGHT: 208.1 LBS | HEIGHT: 68 IN | OXYGEN SATURATION: 97 % | RESPIRATION RATE: 16 BRPM | BODY MASS INDEX: 31.54 KG/M2 | SYSTOLIC BLOOD PRESSURE: 130 MMHG

## 2022-08-31 DIAGNOSIS — E78.2 MIXED HYPERLIPIDEMIA: ICD-10-CM

## 2022-08-31 DIAGNOSIS — I10 ESSENTIAL HYPERTENSION: ICD-10-CM

## 2022-08-31 DIAGNOSIS — I73.9 PAD (PERIPHERAL ARTERY DISEASE) (HCC): ICD-10-CM

## 2022-08-31 DIAGNOSIS — R06.02 SOB (SHORTNESS OF BREATH): ICD-10-CM

## 2022-08-31 DIAGNOSIS — I25.10 CORONARY ARTERY DISEASE INVOLVING NATIVE CORONARY ARTERY OF NATIVE HEART WITHOUT ANGINA PECTORIS: Primary | ICD-10-CM

## 2022-08-31 PROCEDURE — 99204 OFFICE O/P NEW MOD 45 MIN: CPT | Performed by: SPECIALIST

## 2022-08-31 RX ORDER — FLASH GLUCOSE SENSOR
KIT MISCELLANEOUS
COMMUNITY
Start: 2022-08-14

## 2022-08-31 RX ORDER — ROSUVASTATIN CALCIUM 40 MG/1
40 TABLET, COATED ORAL AT BEDTIME
COMMUNITY

## 2022-08-31 NOTE — PROGRESS NOTES
HISTORY OF PRESENT ILLNESS  Alize Jasmine is a 57 y.o. male     SUMMARY:   Problem List  Date Reviewed: 8/31/2022            Codes Class Noted    Hyperglycemia due to type 1 diabetes mellitus ([facility] [address] (Chronic) ICD-10-CM: E10.65  ICD-9-CM: 250.01  4/3/2017        CAD (coronary artery disease) (Chronic) ICD-10-CM: I25.10  ICD-9-CM: 414.00  4/3/2017        Carpal tunnel syndrome of left wrist ICD-10-CM: G56.02  ICD-9-CM: 354.0  4/3/2017           Current Outpatient Medications on File Prior to Visit   Medication Sig    FreeStyle Armaan 2 Sensor kit PLACE 1 APPLICATOR ON THE SKIN EVERY 14 DAYS    rosuvastatin (CRESTOR) 40 mg tablet Take 40 mg by mouth At bedtime. albuterol (PROVENTIL HFA, VENTOLIN HFA, PROAIR HFA) 90 mcg/actuation inhaler Take 2 Puffs by inhalation every six (6) hours as needed. amitriptyline (ELAVIL) 75 mg tablet Take 75 mg by mouth in the morning. atorvastatin (LIPITOR) 80 mg tablet Take 80 mg by mouth in the morning. celecoxib (CELEBREX) 100 mg capsule Take 100 mg by mouth in the morning. clopidogreL (PLAVIX) 75 mg tab Take 75 mg by mouth in the morning.    hydrOXYzine HCL (ATARAX) 25 mg tablet Take 25 mg by mouth three (3) times daily as needed. canagliflozin (INVOKANA) 300 mg tablet Take 300 mg by mouth Daily (before breakfast). nitroglycerin (NITROSTAT) 0.4 mg SL tablet 0.4 mg by SubLINGual route every five (5) minutes as needed for Chest Pain. Up to 3 doses. semaglutide (Ozempic) 1 mg/dose (4 mg/3 mL) pnij 1 mg by SubCUTAneous route. insulin aspart U-100 (NOVOLOG) 100 unit/mL injection by SubCUTAneous route. Use per pump settings 1.0-1.5 units/hr plus 10-20 units with meals    HYDROcodone-acetaminophen (NORCO) 5-325 mg per tablet Take 1-2 Tabs by mouth every four (4) hours as needed for Pain. Max Daily Amount: 12 Tabs. aspirin 81 mg chewable tablet Take  by mouth daily.     carvediloL (COREG) 3.125 mg tablet Take 3.125 mg by mouth two (2) times daily (with meals). gabapentin (NEURONTIN) 600 mg tablet Take 600 mg by mouth three (3) times daily. insulin pump (PATIENT SUPPLIED) [facility] by SubCUTAneous route. isosorbide mononitrate ER (IMDUR) 30 mg tablet Take 30 mg by mouth daily. metFORMIN (GLUCOPHAGE) 1,000 mg tablet Take 1,000 mg by mouth two (2) times daily (with meals). ranolazine ER (RANEXA) 1,000 mg Take 1,000 mg by mouth two (2) times a day. No current facility-administered medications on file prior to visit. CARDIOLOGY STUDIES TO DATE:  No specialty comments available. Chief Complaint   Patient presents with    New Patient     HPI :  He is referred by advanced heart failure for cardiac care. He is lives in the [facility] and has been taken care of by cardiology at their for years. He had a bypass operation in 2011 and has had a couple of Procedures since then, with a stent to his native LAD in April 2020 after cath showed an occluded LIMA. His last cath was in April of this year which showed stable findings. His main concern is shortness of breath and ongoing problems with claudication in spite of the fact that he has had some peripheral artery stenting in the past again in [facility]. He is a past smoker has diabetes hyperlipidemia and hypertension. Family history is negative for premature coronary disease. He still works as an  and manages in spite of all his problems. There is some concern that he may have pulmonary sarcoid as well. CARDIAC ROS:   negative for chest pain, palpitations, syncope, orthopnea, paroxysmal nocturnal dyspnea, exertional chest pressure/discomfort, lower extremity edema    No family history on file.     Past Medical History:   Diagnosis Date    Anginal syndrome (HCC)     CAD (coronary artery disease)     Carpal tunnel syndrome, bilateral     Chronic obstructive pulmonary disease (HCC)     Hyperlipidemia     Hypertension     MI (myocardial infarction) (HCC)     Migraine Orthostatic hypotension     PAD (peripheral artery disease) (HCC)     Sleep apnea     no cpap    T2DM (type 2 diabetes mellitus) ([facility] [address]        GENERAL ROS:  A comprehensive review of systems was negative except for that written in the HPI. Visit Vitals  /88   Pulse 70   Resp 16   Ht 5' 8\" (1.727 m)   Wt 208 lb 1.6 oz (94.4 kg)   SpO2 97%   BMI 31.64 kg/m²       Wt Readings from Last 3 Encounters:   08/31/22 208 lb 1.6 oz (94.4 kg)   07/22/22 201 lb 3.2 oz (91.3 kg)   04/03/17 230 lb (104.3 kg)            BP Readings from Last 3 Encounters:   08/31/22 130/88   07/22/22 130/64   04/03/17 147/71       PHYSICAL EXAM  General appearance: alert, cooperative, no distress, appears stated age  Neurologic: Alert and oriented X 3  Neck: supple, symmetrical, trachea midline, no adenopathy, no carotid bruit, and no JVD  Lungs: clear to auscultation bilaterally  Heart: regular rate and rhythm, S1, S2 normal, no murmur, click, rub or gallop  Abdomen: soft, non-tender. Bowel sounds normal. No masses,  no organomegaly  Extremities: extremities normal, atraumatic, no cyanosis or edema      ASSESSMENT :      In terms of his ischemic heart disease things seem to be relatively stable at least based on the report of his most recent heart catheterization I do not think he needs any intervention in that regard. Advanced heart failure recommended a right heart cath which I do think could be useful in trying to determine whether his symptoms are mostly cardiac or pulmonary and obviously he needs a definitive answer about whether or not he has pulmonary sarcoid to see how that might play into his symptoms as well. He is a very tangential historian and I am not sure exactly what he expects from us and he was very unclear about whether or not he was going to follow-up here or in [facility].   We will schedule him a follow-up appointment at this point but I will be happy to see him at any time if he wants to get any further input.  current treatment plan is effective, no change in therapy  lab results and schedule of future lab studies reviewed with patient  reviewed diet, exercise and weight control    Encounter Diagnoses   Name Primary? Essential hypertension Yes    Coronary artery disease involving native coronary artery of native heart without angina pectoris     Mixed hyperlipidemia     SOB (shortness of breath)      Orders Placed This Encounter    FreeStyle Armaan 2 Sensor kit    rosuvastatin (CRESTOR) 40 mg tablet       Follow-up and Dispositions    Return in about 6 months (around 2/28/2023). Karl Gomes MD  8/31/2022  Please note that this dictation was completed with [employer], the [employer] voice recognition software. Quite often unanticipated grammatical, syntax, homophones, and other interpretive errors are inadvertently transcribed by the computer software. Please disregard these errors. Please excuse any errors that have escaped final proofreading. Thank you.

## 2022-12-07 ENCOUNTER — TELEPHONE (OUTPATIENT)
Dept: CARDIOLOGY CLINIC | Age: 62
End: 2022-12-07

## 2022-12-07 NOTE — TELEPHONE ENCOUNTER
Received call from patient stating he has lost his after visit summary with his last office visit with Dr. Miracle Miguel 7/31 or with Dr. Chris Mcneill 8/31. He stated he had to have a test done but was unable to recall. Patient requesting call back from nurse.

## 2022-12-07 NOTE — TELEPHONE ENCOUNTER
I returned call to patient, gave him number to [facility] tech support to re-set his mychart. I then gave him number to Dr. Jodi Uriarte office to set up [address] and then advised he call [facility] back to schedule follow up appt.

## 2022-12-07 NOTE — TELEPHONE ENCOUNTER
Pt lost his last office visit summary, and would like to know if he can have it emailed to him or put through [address] [place] [address].        Pt #   [phone]

## 2025-03-24 NOTE — TELEPHONE ENCOUNTER
Called pt. Verified patient's identity with two identifiers. His mychart is only pulling up [facility]. He wants records for his doctor. He does not the doctor's name, but said first name is Alondra Eric. He has a plan, but needs mychart access. We deactivated his mychart and I sent him a text to launch new set up. Pt will let us know if still having problem. Much improved since her last blood test.  Continue to watch her diet and stay active.

## (undated) DEVICE — DRAPE,REIN 53X77,STERILE: Brand: MEDLINE

## (undated) DEVICE — SOLUTION IV 1000ML 0.9% SOD CHL

## (undated) DEVICE — HOOK LOCK LATEX FREE ELASTIC BANDAGE 3INX5YD

## (undated) DEVICE — TIBURON HAND DRAPE: Brand: CONVERTORS

## (undated) DEVICE — KENDALL SCD EXPRESS SLEEVES, KNEE LENGTH, MEDIUM: Brand: KENDALL SCD

## (undated) DEVICE — Device

## (undated) DEVICE — GAUZE SPONGES,12 PLY: Brand: CURITY

## (undated) DEVICE — (D)PREP SKN CHLRAPRP APPL 26ML -- CONVERT TO ITEM 371833

## (undated) DEVICE — (D)SYR 10ML 1/5ML GRAD NSAF -- PKGING CHANGE USE ITEM 338027

## (undated) DEVICE — SOL ANTI-FOG 6ML MEDC -- MEDICHOICE - CONVERT TO 358427

## (undated) DEVICE — BANDAGE COMPR 9 FTX4 IN SMOOTH COMFORTABLE SYNTH ESMRK LF

## (undated) DEVICE — BLADE OPHTH MINI BEAV SHRP --

## (undated) DEVICE — DISPOSABLE TOURNIQUET CUFF SINGLE BLADDER, DUAL PORT AND QUICK CONNECT CONNECTOR: Brand: COLOR CUFF

## (undated) DEVICE — STANDARD (U) BLADE ASSEMBLY 1PK: Brand: MICROAIRE®

## (undated) DEVICE — OCCLUSIVE GAUZE STRIP,3% BISMUTH TRIBROMOPHENATE IN PETROLATUM BLEND: Brand: XEROFORM

## (undated) DEVICE — STERILE POLYISOPRENE POWDER-FREE SURGICAL GLOVES: Brand: PROTEXIS

## (undated) DEVICE — SYR IRR BLB 2OZ DISP BLU STRL -- CONVERT TO ITEM 357637

## (undated) DEVICE — INFECTION CONTROL KIT SYS

## (undated) DEVICE — NEEDLE HYPO 25GA L1.5IN BVL ORIENTED ECLIPSE

## (undated) DEVICE — SUT ETHLN 4-0 18IN PS2 BLK --